# Patient Record
(demographics unavailable — no encounter records)

---

## 2025-04-03 NOTE — IMAGING
[de-identified] : Right elbow with posterior olecranon bursitis no ttp minimal erythema ROM is full in all planes strength is 5/5 in all planes there is no ligamentous laxity noted.  04/03/2025 right elbow 3 view x-ray showed: small olecranon bone spur.

## 2025-04-03 NOTE — HISTORY OF PRESENT ILLNESS
[Dull/Aching] : dull/aching [Localized] : localized [Sharp] : sharp [Intermittent] : intermittent [Nothing helps with pain getting better] : Nothing helps with pain getting better [Full time] : Work status: full time [de-identified] : 04/03/2025 :MIHAELA CHUNG , a 49 year old male, presents today for right elbow pain/swelling. Pt denies hx of trauma.  Mr. Chung recently had bursa aspirated by a friend 03/30/2025. Pt has been taking Motrin 400 mg bid.   PMH: Hyperlipidemia. 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

## 2025-04-03 NOTE — ASSESSMENT
[FreeTextEntry1] : The patient was advised of the diagnosis. The natural history of the pathology was explained in full to the patient in layman's terms. All questions were answered. The risks and benefits of surgical and non-surgical treatment alternatives were explained in full to the patient.  Right elbow aspirated today.  Pt will continue compression sleeve x 1 week. Provided Mobic 15mg/d x 10 days. RTO prn.   NSAIDs recommended.  Patient warned of risk of NSAID medication to stomach and GI tract, risk of increase blood pressure, cardiac risk, and risk of fluid retention.  The patient should clear taking medication with internist/PMD if any problem with heart, blood pressure, or GI system exists.

## 2025-04-22 NOTE — HISTORY OF PRESENT ILLNESS
[Dull/Aching] : dull/aching [Localized] : localized [Sharp] : sharp [Intermittent] : intermittent [Nothing helps with pain getting better] : Nothing helps with pain getting better [Full time] : Work status: full time [de-identified] : 4/22/25: recurrence of the right elbow bursa.   04/03/2025 :MIHAELA CHUNG , a 49 year old male, presents today for right elbow pain/swelling. Pt denies hx of trauma.  Mr. Chung recently had bursa aspirated by a friend 03/30/2025. Pt has been taking Motrin 400 mg bid.   PMH: Hyperlipidemia. 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

## 2025-04-22 NOTE — IMAGING
[de-identified] : Right elbow with posterior olecranon bursitis no ttp minimal erythema ROM is full in all planes strength is 5/5 in all planes there is no ligamentous laxity noted.  04/03/2025 right elbow 3 view x-ray showed: small olecranon bone spur.

## 2025-05-05 NOTE — IMAGING
[de-identified] : Right elbow with posterior olecranon bursitis no ttp minimal erythema ROM is full in all planes strength is 5/5 in all planes there is no ligamentous laxity noted.  04/03/2025 right elbow 3 view x-ray showed: small olecranon bone spur.

## 2025-05-05 NOTE — HISTORY OF PRESENT ILLNESS
[Dull/Aching] : dull/aching [Localized] : localized [Sharp] : sharp [Intermittent] : intermittent [Nothing helps with pain getting better] : Nothing helps with pain getting better [Full time] : Work status: full time [de-identified] : 05/05/2025: Pt states right olecranon bursitis has returned.  4/22/25: recurrence of the right elbow bursa.   04/03/2025 :MIHAELA CHUNG , a 49 year old male, presents today for right elbow pain/swelling. Pt denies hx of trauma.  Mr. Chung recently had bursa aspirated by a friend 03/30/2025. Pt has been taking Motrin 400 mg bid.   PMH: Hyperlipidemia. 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

## 2025-05-05 NOTE — ASSESSMENT
[FreeTextEntry1] : The patient was advised of the diagnosis. The natural history of the pathology was explained in full to the patient in layman's terms. All questions were answered. The risks and benefits of surgical and non-surgical treatment alternatives were explained in full to the patient.  Right elbow aspirated today and cortisone injection provided. Compression dressing provided today.  Compression sleeve did not work and pt discontinued this.  RTO prn.